# Patient Record
Sex: FEMALE | NOT HISPANIC OR LATINO | URBAN - METROPOLITAN AREA
[De-identification: names, ages, dates, MRNs, and addresses within clinical notes are randomized per-mention and may not be internally consistent; named-entity substitution may affect disease eponyms.]

---

## 2021-05-26 ENCOUNTER — IMPORTED ENCOUNTER (OUTPATIENT)
Dept: URBAN - METROPOLITAN AREA CLINIC 1 | Facility: CLINIC | Age: 71
End: 2021-05-26

## 2021-05-26 PROBLEM — H43.813: Noted: 2021-05-26

## 2021-05-26 PROBLEM — H25.813: Noted: 2021-05-26

## 2021-05-26 PROCEDURE — 92015 DETERMINE REFRACTIVE STATE: CPT

## 2021-05-26 PROCEDURE — 99204 OFFICE O/P NEW MOD 45 MIN: CPT

## 2021-05-26 NOTE — PATIENT DISCUSSION
1.  Cataract OU- Observe for now without intervention. The patient was advised to contact us if any change or worsening of vision2. PVD OU- RD precautions. 3.  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 Patient is taking Prednisone 20mg. Patient to do HVF at next visit to get baseline. MRX for glasses givenReturn for an appointment in 2 month 10/HVF (24-2)with Dr. Ida Escalona.

## 2021-05-26 NOTE — PATIENT DISCUSSION
1.  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 Patient is taking Prednisone 20mg. Patient to do HVF at next visit to get baseline. Return for an appointment in 2 month 10/HVF (24-2)with Dr. Jacob Pierce.

## 2021-07-28 ENCOUNTER — IMPORTED ENCOUNTER (OUTPATIENT)
Dept: URBAN - METROPOLITAN AREA CLINIC 1 | Facility: CLINIC | Age: 71
End: 2021-07-28

## 2021-07-28 PROBLEM — I77.6: Noted: 2021-07-28

## 2021-07-28 PROCEDURE — 92083 EXTENDED VISUAL FIELD XM: CPT

## 2021-07-28 PROCEDURE — 99213 OFFICE O/P EST LOW 20 MIN: CPT

## 2021-07-28 NOTE — PATIENT DISCUSSION
1.  Temporal Artritis -- Diagnosed by neurology 04/05/2021. Patient is taking Prednisone 10mg. HVF WNL OU today. 2. Cataract OU -- Observe for now without intervention. The patient was advised to contact us if any change or worsening of vision3. PVD OUCTL RX finalized and given to patient today based of wearing  per pt. (Okay per PVM)Return for an appointment in 6 month 10/repeat HVF with Dr. Kimberly Huertas.

## 2022-02-04 ENCOUNTER — IMPORTED ENCOUNTER (OUTPATIENT)
Dept: URBAN - METROPOLITAN AREA CLINIC 1 | Facility: CLINIC | Age: 72
End: 2022-02-04

## 2022-02-04 PROBLEM — M31.6: Status: STABILIZING | Noted: 2022-02-04

## 2022-02-04 PROBLEM — H25.813: Noted: 2022-02-04

## 2022-02-04 PROBLEM — I77.6: Noted: 2022-02-04

## 2022-02-04 NOTE — PATIENT DISCUSSION
1.  Temporal Artritis -- Diagnosed by neurology 04/05/2021. HVF remains WNL OU. Patient is taking Prednisone 10mg cont as directed by Dr. Yumiko Velarde patients neurologist. 2.  Cataract OU -- Observe for now without intervention. The patient was advised to contact us if any change or worsening of vision3. PVD OU -- RD precautions. Letter to Neurologist. Return for an appointment in 6 month 30/glare with Dr. Nolberto Galarza.

## 2022-04-02 ASSESSMENT — TONOMETRY
OD_IOP_MMHG: 10
OS_IOP_MMHG: 11
OD_IOP_MMHG: 11
OD_IOP_MMHG: 11
OS_IOP_MMHG: 11
OS_IOP_MMHG: 10

## 2022-04-02 ASSESSMENT — VISUAL ACUITY
OD_SC: 20/20
OS_SC: 20/100
OS_SC: 20/20-2
OS_CC: J1+
OS_CC: J1+
OD_SC: 20/25+2
OD_SC: 20/20

## 2022-08-10 ENCOUNTER — COMPREHENSIVE EXAM (OUTPATIENT)
Dept: URBAN - METROPOLITAN AREA CLINIC 2 | Facility: CLINIC | Age: 72
End: 2022-08-10

## 2022-08-10 DIAGNOSIS — H04.123: ICD-10-CM

## 2022-08-10 DIAGNOSIS — M31.6: ICD-10-CM

## 2022-08-10 DIAGNOSIS — H16.142: ICD-10-CM

## 2022-08-10 DIAGNOSIS — H43.813: ICD-10-CM

## 2022-08-10 DIAGNOSIS — H25.813: ICD-10-CM

## 2022-08-10 PROCEDURE — 92015 DETERMINE REFRACTIVE STATE: CPT

## 2022-08-10 PROCEDURE — 92012 INTRM OPH EXAM EST PATIENT: CPT

## 2022-08-10 ASSESSMENT — VISUAL ACUITY
OS_BAT: 20/50
OS_CC: J1
OD_BAT: 20/60
OD_CC: 20/30

## 2022-08-10 ASSESSMENT — TONOMETRY
OS_IOP_MMHG: 14
OD_IOP_MMHG: 14

## 2022-08-10 NOTE — PATIENT DISCUSSION
(Surgical) Visually Significant (secondary to glare), discussed the risks, benefits, alternatives, and limitations of cataract surgery. The patient stated a full understanding and a desire to proceed with the procedure. The patient will need to return for pre-op appointment with cataract measurements and to have any additional questions answered, and start pre-operative eye drops as directed. Patient to consider pulse of Steroid use a week before Phaco to help with inflammation. Patient to D/c CTL use 10 days prior to measurements. Phaco OS>OD.  Patient considering MonoVA OD Near OS Distance.

## 2022-08-10 NOTE — PATIENT DISCUSSION
Patient is taking Prednisone 5mg Every other day as directed by Dr. Colvin patients neurologist. Patient to consider doing a pulse in Pred.  1 week prior to Phaco.

## 2022-08-26 ENCOUNTER — PRE-OP/H&P (OUTPATIENT)
Dept: URBAN - METROPOLITAN AREA CLINIC 2 | Facility: CLINIC | Age: 72
End: 2022-08-26

## 2022-08-26 VITALS
HEIGHT: 66 IN | BODY MASS INDEX: 18.32 KG/M2 | WEIGHT: 114 LBS | DIASTOLIC BLOOD PRESSURE: 62 MMHG | SYSTOLIC BLOOD PRESSURE: 116 MMHG | HEART RATE: 79 BPM

## 2022-08-26 DIAGNOSIS — H25.813: ICD-10-CM

## 2022-08-26 PROCEDURE — 92136 OPHTHALMIC BIOMETRY: CPT

## 2022-08-26 PROCEDURE — 99499 UNLISTED E&M SERVICE: CPT

## 2022-08-26 PROCEDURE — 92025 CPTRIZED CORNEAL TOPOGRAPHY: CPT

## 2022-08-26 ASSESSMENT — VISUAL ACUITY
OS_BAT: 20/60
OS_SC: 20/200
OD_SC: 20/300
OD_BAT: 20/60

## 2022-08-26 NOTE — PATIENT DISCUSSION
Cataract (OS) -- Visually Significant (Secondary to glare), discussed the risks, benefits, alternatives, and limitations of cataract surgery. The patient stated a full understanding and a desire to proceed with the procedure. Discussed with patient if post-op drops are more than $120 for all three combined when filling at their Pharmacy, please call our office to request generic substitutions. Will have patient start on Prednisone 5mg QD until surgery and day of due to temporal arteritis ( 15 pills sent to patient's pharmacy today). Okay to proceed with Phaco PCL [employer] w/[employer].

## 2022-08-26 NOTE — PATIENT DISCUSSION
Patient is taking Prednisone 5mg Every other day as directed by Dr. Tal Doss patients neurologist. Patient to consider doing a pulse in Pred.  1 week prior to Phaco.

## 2022-08-26 NOTE — PATIENT DISCUSSION
Visually Significant (secondary to glare), discussed the risks, benefits, alternatives, and limitations of cataract surgery. The patient stated a full understanding and a desire to proceed with the procedure. The patient will need to return for pre-op appointment with cataract measurements and to have any additional questions answered, and start pre-operative eye drops as directed. Okay to schedule Phaco H&P OD.

## 2022-09-06 ENCOUNTER — SURGERY/PROCEDURE (OUTPATIENT)
Dept: URBAN - METROPOLITAN AREA SURGERY 1 | Facility: SURGERY | Age: 72
End: 2022-09-06

## 2022-09-06 DIAGNOSIS — H25.812: ICD-10-CM

## 2022-09-06 PROCEDURE — 66984 XCAPSL CTRC RMVL W/O ECP: CPT

## 2022-09-06 NOTE — PATIENT DISCUSSION
Patient is taking Prednisone 5mg Every other day as directed by Dr. Angel Blum patients neurologist. Patient to consider doing a pulse in Pred.  1 week prior to Phaco.

## 2022-09-06 NOTE — PATIENT DISCUSSION
Cataract (OS) -- Visually Significant (Secondary to glare), discussed the risks, benefits, alternatives, and limitations of cataract surgery. The patient stated a full understanding and a desire to proceed with the procedure. Discussed with patient if post-op drops are more than $120 for all three combined when filling at their Pharmacy, please call our office to request generic substitutions. Will have patient start on Prednisone 5mg QD until surgery and day of due to temporal arteritis ( 15 pills sent to patient's pharmacy today). Okay to proceed with Phaco PCL [employer] w/[employer].

## 2022-09-07 ENCOUNTER — POST-OP (OUTPATIENT)
Dept: URBAN - METROPOLITAN AREA CLINIC 2 | Facility: CLINIC | Age: 72
End: 2022-09-07

## 2022-09-07 DIAGNOSIS — Z96.1: ICD-10-CM

## 2022-09-07 PROCEDURE — 99024 POSTOP FOLLOW-UP VISIT: CPT

## 2022-09-07 ASSESSMENT — TONOMETRY: OS_IOP_MMHG: 15

## 2022-09-07 ASSESSMENT — VISUAL ACUITY
OS_SC: 20/25
OS_SC: J2

## 2022-09-07 NOTE — PATIENT DISCUSSION
Patient is taking Prednisone 5mg Every other day as directed by Dr. Minerva Bah patients neurologist. Patient to consider doing a pulse in Pred.  1 week prior to Phaco.

## 2022-09-07 NOTE — PATIENT DISCUSSION
Cataract (OD) -- Visually Significant (Secondary to glare), discussed the risks, benefits, alternatives, and limitations of cataract surgery. The patient stated a full understanding and a desire to proceed with the procedure. Discussed with patient if post-op drops are more than $120 for all three combined when filling at their Pharmacy, please call our office to request generic substitutions. Will have patient start on Prednisone 5mg QD until surgery and day of due to temporal arteritis ( 15 pills sent to patient's pharmacy today). Okay to proceed with Phaco PCL [employer] w/[employer].

## 2022-09-13 ENCOUNTER — POST-OP (OUTPATIENT)
Dept: URBAN - METROPOLITAN AREA CLINIC 1 | Facility: CLINIC | Age: 72
End: 2022-09-13

## 2022-09-13 VITALS
DIASTOLIC BLOOD PRESSURE: 54 MMHG | BODY MASS INDEX: 18.32 KG/M2 | HEART RATE: 83 BPM | HEIGHT: 66 IN | SYSTOLIC BLOOD PRESSURE: 98 MMHG | WEIGHT: 114 LBS

## 2022-09-13 DIAGNOSIS — H25.811: ICD-10-CM

## 2022-09-13 DIAGNOSIS — Z96.1: ICD-10-CM

## 2022-09-13 PROCEDURE — 99024 POSTOP FOLLOW-UP VISIT: CPT

## 2022-09-13 PROCEDURE — 92025 CPTRIZED CORNEAL TOPOGRAPHY: CPT

## 2022-09-13 PROCEDURE — 92136 OPHTHALMIC BIOMETRY: CPT

## 2022-09-13 ASSESSMENT — VISUAL ACUITY
OD_SC: 20/300
OD_BAT: 20/400
OS_SC: J1
OS_SC: 20/25

## 2022-09-13 ASSESSMENT — TONOMETRY
OD_IOP_MMHG: 14
OS_IOP_MMHG: 13

## 2022-09-13 NOTE — PATIENT DISCUSSION
Cataract (OD) -- Visually Significant (Secondary to glare), discussed the risks, benefits, alternatives, and limitations of cataract surgery. The patient stated a full understanding and a desire to proceed with the procedure. Discussed with patient if post-op drops are more than $120 for all three combined when filling at their Pharmacy, please call our office to request generic substitutions. Phaco PCL.

## 2022-09-13 NOTE — PATIENT DISCUSSION
Patient is taking Prednisone 5mg Every other day as directed by Dr. Rosa Stafford patients neurologist. Patient to consider doing a pulse in Pred.  1 week prior to Phaco.

## 2022-09-20 ENCOUNTER — SURGERY/PROCEDURE (OUTPATIENT)
Dept: URBAN - METROPOLITAN AREA SURGERY 1 | Facility: SURGERY | Age: 72
End: 2022-09-20

## 2022-09-20 DIAGNOSIS — H25.811: ICD-10-CM

## 2022-09-20 PROCEDURE — 66984 XCAPSL CTRC RMVL W/O ECP: CPT

## 2022-09-20 NOTE — PATIENT DISCUSSION
Patient is taking Prednisone 5mg Every other day as directed by Dr. Hatfield Pu patients neurologist. Patient to consider doing a pulse in Pred.  1 week prior to Phaco.

## 2022-09-21 ENCOUNTER — POST-OP (OUTPATIENT)
Dept: URBAN - METROPOLITAN AREA CLINIC 2 | Facility: CLINIC | Age: 72
End: 2022-09-21

## 2022-09-21 DIAGNOSIS — Z96.1: ICD-10-CM

## 2022-09-21 PROCEDURE — 99024 POSTOP FOLLOW-UP VISIT: CPT

## 2022-09-21 ASSESSMENT — VISUAL ACUITY
OD_SC: 20/25-1
OD_SC: J1+

## 2022-09-21 ASSESSMENT — TONOMETRY: OD_IOP_MMHG: 15

## 2022-09-21 NOTE — PATIENT DISCUSSION
Patient is taking Prednisone 5mg Every other day as directed by Dr. Jake Antoine patients neurologist. Patient to consider doing a pulse in Pred.  1 week prior to Phaco.

## 2022-09-21 NOTE — PATIENT DISCUSSION
Continue with post-operative drops (Pred Combo) until completed OU. Discussed warning symptoms of RD/infection with pt who understands to call/seek urgent evaluation if they occur. Patient to wear protective shield over operative eye when sleeping x 1 week. Return as scheduled 10/14/2022.

## 2022-10-03 ENCOUNTER — FOLLOW UP (OUTPATIENT)
Dept: URBAN - METROPOLITAN AREA CLINIC 1 | Facility: CLINIC | Age: 72
End: 2022-10-03

## 2022-10-03 DIAGNOSIS — Z96.1: ICD-10-CM

## 2022-10-03 PROCEDURE — 99024 POSTOP FOLLOW-UP VISIT: CPT

## 2022-10-03 ASSESSMENT — VISUAL ACUITY
OD_SC: 20/25
OS_SC: 20/20

## 2022-10-03 ASSESSMENT — TONOMETRY
OD_IOP_MMHG: 12
OS_IOP_MMHG: 12

## 2022-10-03 NOTE — PATIENT DISCUSSION
Patient is taking Prednisone 5mg Every other day as directed by Dr. Morales Nath patients neurologist. Patient to consider doing a pulse in Pred.  1 week prior to Phaco.

## 2022-10-14 ENCOUNTER — FOLLOW UP (OUTPATIENT)
Dept: URBAN - METROPOLITAN AREA CLINIC 2 | Facility: CLINIC | Age: 72
End: 2022-10-14

## 2022-10-14 DIAGNOSIS — Z96.1: ICD-10-CM

## 2022-10-14 PROCEDURE — 99024 POSTOP FOLLOW-UP VISIT: CPT

## 2022-10-14 ASSESSMENT — VISUAL ACUITY
OS_SC: 20/20-1
OU_SC: 20/20-1
OS_SC: 20/25
OU_SC: 20/20-1
OD_SC: 20/20-1
OD_SC: 20/40

## 2022-10-14 ASSESSMENT — TONOMETRY
OD_IOP_MMHG: 13
OS_IOP_MMHG: 13

## 2022-10-14 NOTE — PATIENT DISCUSSION
Patient is taking Prednisone 5mg Every other day as directed by Dr. Srinivasan Severe patients neurologist. Patient to consider doing a pulse in Pred.  1 week prior to Phaco.

## 2022-11-16 ENCOUNTER — POST-OP (OUTPATIENT)
Dept: URBAN - METROPOLITAN AREA CLINIC 2 | Facility: CLINIC | Age: 72
End: 2022-11-16

## 2022-11-16 DIAGNOSIS — Z96.1: ICD-10-CM

## 2022-11-16 PROCEDURE — 99024 POSTOP FOLLOW-UP VISIT: CPT

## 2022-11-16 ASSESSMENT — TONOMETRY
OD_IOP_MMHG: 14
OS_IOP_MMHG: 14

## 2022-11-16 ASSESSMENT — VISUAL ACUITY
OD_SC: 20/20
OS_SC: 20/25-2
OD_SC: J1+
OS_SC: J1+

## 2022-11-16 NOTE — PATIENT DISCUSSION
Patient came in today complaining of pain of the right eye over the last 10 days. 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 Advised the patient to re-start on her pred combo drop TID OD for three days, then BID OD for three days, then QD for three days and d/c. F/u 2 weeks.

## 2022-11-16 NOTE — PATIENT DISCUSSION
Patient is taking Prednisone 5mg Every other day as directed by Dr. Gabi Hutchinson (patients neurologist).

## 2022-12-07 ENCOUNTER — POST-OP (OUTPATIENT)
Dept: URBAN - METROPOLITAN AREA CLINIC 2 | Facility: CLINIC | Age: 72
End: 2022-12-07

## 2022-12-07 PROCEDURE — 99024 POSTOP FOLLOW-UP VISIT: CPT

## 2022-12-07 ASSESSMENT — VISUAL ACUITY
OS_SC: 20/20
OD_SC: 20/20

## 2022-12-07 ASSESSMENT — TONOMETRY
OS_IOP_MMHG: 14
OD_IOP_MMHG: 14

## 2022-12-07 NOTE — PATIENT DISCUSSION
Patient here today still complaining of some discomfort of the right eye. She states that when she re-started her pred combo drop it caused more discomfort. Advised the patient to begin the use of Prolensa QD OD( Sample given) for the next 1-2 weeks and continue artificial tears 5x/day.

## 2022-12-07 NOTE — PATIENT DISCUSSION
Patient is taking Prednisone 5mg Every other day as directed by Dr. Chip Hardy (patients neurologist). Advised her to follow-up with Neurology as scheduled.

## 2023-06-07 ENCOUNTER — FOLLOW UP (OUTPATIENT)
Dept: URBAN - METROPOLITAN AREA CLINIC 2 | Facility: CLINIC | Age: 73
End: 2023-06-07

## 2023-06-07 DIAGNOSIS — M31.6: ICD-10-CM

## 2023-06-07 DIAGNOSIS — H04.123: ICD-10-CM

## 2023-06-07 DIAGNOSIS — M06.9: ICD-10-CM

## 2023-06-07 PROCEDURE — 92012 INTRM OPH EXAM EST PATIENT: CPT

## 2023-06-07 ASSESSMENT — VISUAL ACUITY
OD_SC: 20/25-1
OS_SC: 20/25-1

## 2023-06-07 ASSESSMENT — TONOMETRY
OS_IOP_MMHG: 15
OD_IOP_MMHG: 15

## 2023-12-06 ENCOUNTER — COMPREHENSIVE EXAM (OUTPATIENT)
Dept: URBAN - METROPOLITAN AREA CLINIC 2 | Facility: CLINIC | Age: 73
End: 2023-12-06

## 2023-12-06 DIAGNOSIS — H04.123: ICD-10-CM

## 2023-12-06 DIAGNOSIS — Z96.1: ICD-10-CM

## 2023-12-06 DIAGNOSIS — H43.813: ICD-10-CM

## 2023-12-06 DIAGNOSIS — M31.6: ICD-10-CM

## 2023-12-06 PROCEDURE — 99214 OFFICE O/P EST MOD 30 MIN: CPT

## 2023-12-06 ASSESSMENT — VISUAL ACUITY
OD_SC: 20/25
OS_SC: 20/20

## 2023-12-06 ASSESSMENT — TONOMETRY
OS_IOP_MMHG: 14
OD_IOP_MMHG: 14

## 2024-06-12 ENCOUNTER — FOLLOW UP (OUTPATIENT)
Dept: URBAN - METROPOLITAN AREA CLINIC 2 | Facility: CLINIC | Age: 74
End: 2024-06-12

## 2024-06-12 DIAGNOSIS — M31.6: ICD-10-CM

## 2024-06-12 PROCEDURE — 92083 EXTENDED VISUAL FIELD XM: CPT

## 2024-06-12 PROCEDURE — 99213 OFFICE O/P EST LOW 20 MIN: CPT

## 2024-06-12 ASSESSMENT — TONOMETRY
OS_IOP_MMHG: 14
OD_IOP_MMHG: 14

## 2024-06-12 ASSESSMENT — VISUAL ACUITY
OD_SC: 20/30
OS_BAT: 20/50
OS_SC: 20/30
OD_BAT: 20/50

## 2024-12-04 ENCOUNTER — COMPREHENSIVE EXAM (OUTPATIENT)
Age: 74
End: 2024-12-04

## 2024-12-04 DIAGNOSIS — H43.813: ICD-10-CM

## 2024-12-04 DIAGNOSIS — H26.493: ICD-10-CM

## 2024-12-04 DIAGNOSIS — M31.6: ICD-10-CM

## 2024-12-04 DIAGNOSIS — H04.123: ICD-10-CM

## 2024-12-04 PROCEDURE — 99214 OFFICE O/P EST MOD 30 MIN: CPT

## 2025-06-04 ENCOUNTER — FOLLOW UP (OUTPATIENT)
Age: 75
End: 2025-06-04

## 2025-06-04 DIAGNOSIS — M31.6: ICD-10-CM

## 2025-06-04 PROCEDURE — 99212 OFFICE O/P EST SF 10 MIN: CPT

## 2025-06-04 PROCEDURE — 92083 EXTENDED VISUAL FIELD XM: CPT
